# Patient Record
Sex: FEMALE | Race: WHITE
[De-identification: names, ages, dates, MRNs, and addresses within clinical notes are randomized per-mention and may not be internally consistent; named-entity substitution may affect disease eponyms.]

---

## 2017-02-02 ENCOUNTER — HOSPITAL ENCOUNTER (OUTPATIENT)
Dept: HOSPITAL 44 - RAD | Age: 52
End: 2017-02-02
Attending: NURSE PRACTITIONER
Payer: COMMERCIAL

## 2017-02-02 DIAGNOSIS — M25.571: Primary | ICD-10-CM

## 2017-02-02 PROCEDURE — 73630 X-RAY EXAM OF FOOT: CPT

## 2017-02-02 PROCEDURE — 73610 X-RAY EXAM OF ANKLE: CPT

## 2017-02-02 NOTE — DIAGNOSTIC IMAGING REPORT
[facility]

[address] [facility].[address] [address]

 

 

 

 

Report Submission Date: 2017 4:19:59 PM CST

Patient       Study

Name:   PARMINDER RIDDLE       Date:   2017 2:49:41 PM CST

MRN:          Modality Type:   CR

Gender:   F       Description:   LOWER EXTREMITY

:   69       Institution:   [facility]

Physician:   OLEG HENNESSY            

 

 

Right ankle, 3 views. 



History: Lateral ankle pain for 11 days. 



Findings: The osseous structures are intact without evidence of acute fracture. 
The ankle mortise is normal. There is no soft tissue swelling. 



Impression: 



1. No acute osseous abnormality.

 

Electronically signed on 2017 4:19:59 PM CST by:

Andrez BOWERS

## 2017-02-02 NOTE — DIAGNOSTIC IMAGING REPORT
[facility]

[address] [facility].[address] [address]

 

 

 

 

Report Submission Date: 2017 4:21:29 PM CST

Patient       Study

Name:   PARMINDER RIDDLE       Date:   2017 2:44:09 PM CST

MRN:          Modality Type:   CR

Gender:   F       Description:   LOWER EXTREMITY

:   69       Institution:   [facility]

Physician:   OLEG HENNESSY            

 

 

Right foot, 3 views. 



History: LATERAL RIGHT FOOT PAIN X 11 DAYS 



Findings: The osseous structures are intact without fracture. The joint space 
and alignment are normal. There is no soft tissue abnormality. 



Impression: 



1. No acute osseous abnormality

 

Electronically signed on 2017 4:21:29 PM CST by:

Andrez BOWERS

## 2019-10-03 ENCOUNTER — HOSPITAL ENCOUNTER (OUTPATIENT)
Dept: HOSPITAL 44 - LAB | Age: 54
End: 2019-10-03
Attending: NURSE PRACTITIONER
Payer: COMMERCIAL

## 2019-10-03 DIAGNOSIS — R71.0: Primary | ICD-10-CM

## 2019-10-03 PROCEDURE — 85025 COMPLETE CBC W/AUTO DIFF WBC: CPT

## 2019-10-03 PROCEDURE — 36415 COLL VENOUS BLD VENIPUNCTURE: CPT

## 2019-10-14 LAB
BASOPHILS NFR BLD: 0.7 % (ref 0–1.5)
MCV RBC AUTO: 89 FL (ref 80–100)
NEUTROPHILS #: 4.8 # K/UL (ref 1.4–7.7)

## 2019-11-21 ENCOUNTER — HOSPITAL ENCOUNTER (OUTPATIENT)
Dept: HOSPITAL 44 - POD | Age: 54
End: 2019-11-21
Attending: PODIATRIST
Payer: COMMERCIAL

## 2019-11-21 DIAGNOSIS — M21.6X1: ICD-10-CM

## 2019-11-21 DIAGNOSIS — M21.6X2: ICD-10-CM

## 2019-11-21 DIAGNOSIS — M72.2: Primary | ICD-10-CM

## 2019-11-21 PROCEDURE — 20550 NJX 1 TENDON SHEATH/LIGAMENT: CPT

## 2019-11-21 PROCEDURE — 99213 OFFICE O/P EST LOW 20 MIN: CPT

## 2019-11-26 NOTE — OP CLINIC PROGRESS NOTE
DATE OF VISIT:  11/21/2019  



SUBJECTIVE:  Kena is a 54-year-old female who presented to the clinic today 
for followup of bilateral plantar fasciitis.  She is doing gastrocnemius 
stretches two to three times a day, sometimes four.  She is also using the 
inserts which she states is helping so far, but she would really like to get 
steroid injection on both heels as she is continuing to have a significant 
amount of pain in both bilateral heels in the mornings, etc.  She does not admit
to any other issues or problems.  She does not admit to any allergies.  She does
not admit to any fevers, chills, nausea, vomiting, shortness of breath or chest 
pain.

 

OBJECTIVE: 

Vitals: Temperature 98.4 degrees Fahrenheit, heart rate 69, respiration rate 16,
blood pressure 102/67. O2 saturation is 100% on room air.

Vascular: 2+ DP and PT pulses, bilaterally. Capillary refill time is less than 3
seconds to the toes bilaterally.  There is no edema noted, bilateral feet.

Dermatologic: There is no erythema, ecchymosis or open lesions or any skin 
abnormalities in bilateral feet.

Musculoskeletal: There is still pain on palpation noted significantly at the 
plantar medial calcaneal tubercle bilaterally. There is no pain with 
side-to-side squeeze of the heels. There is no pain on posterior heels from 
previous exam.

The ankle dorsiflexion is 0 degrees with the knee extended, with slight 
improvement to 5 degrees with the knee flexed bilaterally per previous exam. 
There are no other gross abnormalities noted.

Neurologic: Light touch sensation is intact to the toes bilaterally.



ASSESSMENT AND PLAN:    

1.  Plantar fasciitis of the left foot, M72.2.

2.  Plantar fasciitis of the right foot, M72.2.

3.  Gastrocnemius equinus of the left lower extremity, M21.6X2.

4.  Gastrocnemius equinus of the right lower extremity M21.6X1.



The risks and benefits of a steroid injection into the plantar fascia medially 
and under the heel was discussed that include but are not limited to bleeding, 
infection and steroid flare and the patient has agreed both by written and 
verbal consent to go forward with a steroid injection to the bilateral plantar 
fascia origins at this time.





PROCEDURE #1:  A steroid injection to the right heel plantar fascia was 
performed after first getting written consent and verbal consent.  An alcohol 
swab was utilized to cleanse the plantar medial aspect of the heel over the 
right plantar medial calcaneal tubercle.  After cleaning this area with an 
alcohol swab an injection consisting of 1 cc of 2% lidocaine plain, 1 cc of 0.5%
Marcaine plain, 0.5 cc of dexamethasone 4 mg/ml and 0.5 cc of Kenalog 40 mg/ml 
was injected into the plantar fascia area.  The site had hemostasis obtained 
with pressure and a Band-Aid was applied.  The patient tolerated the procedure 
well.



PROCEDURE #2:  A steroid injection into the left heel plantar fascia region was 
also performed in an identical fashion and the patient tolerated the procedure 
well.



Return to clinic in four weeks.  We will consider possible repeat steroid 
injection if needed in the heels versus possible physical therapy initiation at 
that time.  The patient has no further questions or concerns.  She knows she 
needs to continue doing stretching exercises as discussed as well as inserts, 
etc.  We will see her in four weeks for followup.  She knows that if there is 
any redness or streaking noted that she has to go to the ER immediately for 
care.  She knows that I will be out of town next week on Wednesday through the 
following entire week.







Jaiden Moreno D.P.M.



/[employer] [id number]

D:  11/21/2019   T:  11/24/2019

Job#[id number]

[id number]